# Patient Record
Sex: FEMALE | URBAN - METROPOLITAN AREA
[De-identification: names, ages, dates, MRNs, and addresses within clinical notes are randomized per-mention and may not be internally consistent; named-entity substitution may affect disease eponyms.]

---

## 2018-06-12 ENCOUNTER — TELEPHONE (OUTPATIENT)
Dept: FAMILY MEDICINE CLINIC | Facility: CLINIC | Age: 48
End: 2018-06-12

## 2018-06-12 NOTE — TELEPHONE ENCOUNTER
Called and spoke with Mil Herring at [facility]. Informed her that pt has not been seen at our office and that pt had been seen at [place] [id number].

## 2020-08-24 ENCOUNTER — TELEPHONE (OUTPATIENT)
Dept: FAMILY MEDICINE CLINIC | Facility: CLINIC | Age: 50
End: 2020-08-24

## 2020-08-24 NOTE — TELEPHONE ENCOUNTER
Pt notified and verbalized understanding. She states that she doesn't know why we received the results because Dr. Nehal Montilla is her PCP. I told her that I would fax the results to Dr. Janak Royal for her and appreciated that.      Fax # [phone]

## 2020-08-24 NOTE — TELEPHONE ENCOUNTER
Dr. Jack Harman received results of the PFT's that were done at [facility] on 8/19/20.     Per Dr. Jack Harman:    Normal PFT per Pulmonary  Please notify pt

## 2020-09-21 ENCOUNTER — TELEPHONE (OUTPATIENT)
Dept: FAMILY MEDICINE CLINIC | Facility: CLINIC | Age: 50
End: 2020-09-21

## 2020-09-21 NOTE — TELEPHONE ENCOUNTER
Patient's PCP now at [facility]. Patient is being treated with Cefdinir for UTI.   Lisha Burger, 09/21/20, 4:21 PM